# Patient Record
Sex: MALE | Race: WHITE
[De-identification: names, ages, dates, MRNs, and addresses within clinical notes are randomized per-mention and may not be internally consistent; named-entity substitution may affect disease eponyms.]

---

## 2021-11-03 ENCOUNTER — HOSPITAL ENCOUNTER (EMERGENCY)
Dept: HOSPITAL 35 - ER | Age: 61
Discharge: PSYCHIATRIC OR SUBSTANCE USE FACILITY | End: 2021-11-03
Payer: COMMERCIAL

## 2021-11-03 ENCOUNTER — HOSPITAL ENCOUNTER (INPATIENT)
Dept: HOSPITAL 35 - SBH | Age: 61
LOS: 15 days | Discharge: NURSING, REHAB OR LONG-TERM CARE | DRG: 885 | End: 2021-11-18
Attending: PSYCHIATRY & NEUROLOGY | Admitting: PSYCHIATRY & NEUROLOGY
Payer: COMMERCIAL

## 2021-11-03 VITALS — DIASTOLIC BLOOD PRESSURE: 73 MMHG | SYSTOLIC BLOOD PRESSURE: 153 MMHG

## 2021-11-03 VITALS — HEIGHT: 63 IN | WEIGHT: 239 LBS | BODY MASS INDEX: 42.35 KG/M2

## 2021-11-03 VITALS — HEIGHT: 63 IN | BODY MASS INDEX: 35.44 KG/M2 | WEIGHT: 200 LBS

## 2021-11-03 VITALS — SYSTOLIC BLOOD PRESSURE: 147 MMHG | DIASTOLIC BLOOD PRESSURE: 83 MMHG

## 2021-11-03 DIAGNOSIS — F03.91: ICD-10-CM

## 2021-11-03 DIAGNOSIS — T43.212A: ICD-10-CM

## 2021-11-03 DIAGNOSIS — Z88.2: ICD-10-CM
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
## 2021-11-03 DIAGNOSIS — F29: ICD-10-CM

## 2021-11-03 DIAGNOSIS — F32.3: Primary | ICD-10-CM

## 2021-11-03 DIAGNOSIS — I10: ICD-10-CM

## 2021-11-03 DIAGNOSIS — J45.909: ICD-10-CM

## 2021-11-03 DIAGNOSIS — R45.851: ICD-10-CM

## 2021-11-03 DIAGNOSIS — E78.5: ICD-10-CM

## 2021-11-03 DIAGNOSIS — Z20.822: ICD-10-CM

## 2021-11-03 DIAGNOSIS — Z79.899: ICD-10-CM

## 2021-11-03 DIAGNOSIS — F32.9: ICD-10-CM

## 2021-11-03 DIAGNOSIS — Z79.1: ICD-10-CM

## 2021-11-03 DIAGNOSIS — F91.9: Primary | ICD-10-CM

## 2021-11-03 DIAGNOSIS — F01.51: ICD-10-CM

## 2021-11-03 DIAGNOSIS — Z98.890: ICD-10-CM

## 2021-11-03 DIAGNOSIS — Z79.891: ICD-10-CM

## 2021-11-03 DIAGNOSIS — E03.9: ICD-10-CM

## 2021-11-03 PROCEDURE — 10880: CPT

## 2021-11-04 VITALS — DIASTOLIC BLOOD PRESSURE: 55 MMHG | SYSTOLIC BLOOD PRESSURE: 127 MMHG

## 2021-11-04 VITALS — DIASTOLIC BLOOD PRESSURE: 84 MMHG | SYSTOLIC BLOOD PRESSURE: 152 MMHG

## 2021-11-04 VITALS — DIASTOLIC BLOOD PRESSURE: 81 MMHG | SYSTOLIC BLOOD PRESSURE: 127 MMHG

## 2021-11-04 VITALS — SYSTOLIC BLOOD PRESSURE: 125 MMHG | DIASTOLIC BLOOD PRESSURE: 97 MMHG

## 2021-11-04 LAB
CHOLEST SERPL-MCNC: 176 MG/DL (ref ?–200)
FOLATE SERPL-MCNC: 17.8 NG/ML (ref 8.6–58.9)
HDLC SERPL-MCNC: 46 MG/DL (ref 40–?)
LDLC SERPL-MCNC: 102 MG/DL (ref ?–100)
TC:HDL: 3.8 RATIO
TRIGL SERPL-MCNC: 142 MG/DL (ref ?–150)
VIT B12 SERPL-MCNC: 519 PG/ML (ref 193–986)
VLDLC SERPL CALC-MCNC: 28 MG/DL (ref ?–40)

## 2021-11-04 NOTE — NUR
RESUMMED CARE FROM OVERNIGHT SHIFT THIS AM, PATIENT IN ROOM LYING QUIET.
PATIENT ALERT ORIENTED TIMES 4 PATIENT DENIES HI/AH/VH AT PRESENT DOES HAVE
PASSIVE SI. PATIENT STATES HE HAS ANXIETY THAT HE RATES AT A 8, DENIES
DEPRESSION. PATIENT ATE BREAKFAST TOOK MEDICATION WITHOUT INCIDENCE, PATIENTS
ABDOMEN SOFT BOWEL SOUNDS PRESENT. PATIENTS LUNGS CLEAR PATIENT WHEN ASKED TO
EAT IN HIS ROOM FOR MEALS; PATIENT STATED TO ME THAT HE IS SUICIDAL. I TOLD
THE PATIENT HE HAS TO STAY IN THE DAY ROOM; I SHUT HIS DOOR. STAFF GOES WITH
HIM TO HIS ROOM WHEN HE HAS TO USE THE BATHROOM. I TOLD DR WOODY ABOUT THE
INCIDENCE. WILL CONTINUE TO MONITOR PATIENT FOR SAFETY AND BEHAVIORS.

## 2021-11-04 NOTE — NUR
New admit to [facility]. Noted pt triggered high risk screen for 2-14 lb loss and
decreased appetite. Pt unavailable at time of visit, unable to determine
weight hx. Refused breakfast and lunch today, admitted late last noc. Will add
Ensure daily until intake pattern can be assessed. RD to follow up r/t weight
and intake hx. Low nutrition risk at this time.

## 2021-11-04 NOTE — NUR
Damir arrived to [facility] from the ER at 2019 via wheelchair and ED staff. He was
cooperative throughout the admission process. He presented with a disorganized
and tangential thought process as pt would ramble when talking and it was
difficult to follow along with what the pt was saying. He was alert and
oriented x4 and endorsed passive SI at this time. He denied a plan, although
was evasive when answering this question, but was able to contract for safety.
He denied HI/BAKER. Damir is being admitted due to an SI attempt where he
intentionally OD'd on #30 of trazodone 150 mg on 10/31. He stated that what
the "" told him upset him, and he stated "I'm not depressed but when
everything happens at once my anxiety gets out of control". Pt expressed that
he has a hx of bipolar "with anxiety and OCD" and experienced a panic attack
Sunday morning, prior to his suicide attempt. Pt expressed he immediately
called his brother because "I'm too chicken to die". He stated that his last
manic episode was around September/October and that he "charges things to his
account" to where he spends all of his money. He reported difficulty getting a
job as he "made a stupid mistake" and has a misdemeanor for theft and has had
difficulty getting a job since. He reports past suicide attempts, the last was
Feb of 2020 where he took more lithium than prescribed. He also reports he
lost his father in Feb of 2020 and was his primary caregiver leading up to his
death. He spoke about his brothers and neices, and appears to have good
support within his family. He stated he was taking trazodone at  as he has
"racing thoughts at night and can't sleep". Pt arrived to the unit with
clothing items that have been inventoried. Pt appeared dischelved and unkept
upon arrival. Pt was medication compliant this evening and has been withdrawn
and isolative to his room since his arrival. Pt stated he had a BM after his
arrival to the unit and did not endorse any physical complaints at this time.
Will continue to monitor.

## 2021-11-05 VITALS — DIASTOLIC BLOOD PRESSURE: 65 MMHG | SYSTOLIC BLOOD PRESSURE: 114 MMHG

## 2021-11-05 VITALS — SYSTOLIC BLOOD PRESSURE: 132 MMHG | DIASTOLIC BLOOD PRESSURE: 89 MMHG

## 2021-11-05 LAB
EST. AVERAGE GLUCOSE BLD GHB EST-MCNC: 126 MG/DL
GLYCOHEMOGLOBIN (HGB A1C): 6 % (ref 4.8–5.6)

## 2021-11-05 NOTE — NUR
At 0550 pt came out of his room expressing he felt sick to his stomach and did
not sleep at all. This RN went and talked to him in his room and offered pt
zofran which he declined as he stated it was anxiety related. Pt stated that
he continues to experience panic attacks and stated multiple times "it's not
getting better. I'm going home to the same thing" and appeared preoccupied
with what he has to deal with once he's home. Emotional support given and pt
encouraged to use deep breathing techniques but he responded "I can't". He
also stated "I can't cope". He expressed that he feels he is getting worse and
pt was educated that his body is adjusting to his medication changes and that
this is not abnormal. Encouragement given to work on coping skills while he is
here at the hospital and to talk to Dr. Fitzgerald regarding his concerns.

## 2021-11-05 NOTE — NUR
11---1230--Call receoived from patient's daughter re: paperwork being
faxed to [facility] where the patient came from.  I explained I have tried 3
different times with thrree numbers that were given to me by Andra and my fax
report says "no response".  I receoived another number [phone] and tried
to fax to this number and it came back no response again.  I gave daughter the
update I received this am in team meeting.  She states her mother called the
nurse this AM and has not received a call back and she asked me to have the RN
call her mother.  RN states she has the mother's number but she has been gone
for seven days and doesn't have any information about him.  I suggested she
have Portia DON call the wife and goive her information as she hs been here
every day.  Call tried again to Andra ([phone]) no answer; message
left to call me back.

## 2021-11-05 NOTE — NUR
DONI and Dr. Fitzgerald were able to talk to the Pt's brother/DPOA, Ted Ellsworth
over the phone, [phone].  Ted was able to give a brief history.  Pt
has had a lifetime of mental illness.  Pt has a dx of Bi-polar and OCD.  Pt
has been hospitalized several times over the last 15 years.  Pt was
hospitalized at [address], [facility], and [place].  Pt recieves
mental health services through [facility] which includes
medication mangment and case mangement.  The Pt's  is David Joy, [phone].  Pt's father was his caregiver until he passed several
years ago.  Since the Pt has lived on his own. Pt has a history of impulsive
spending.  Ted manages the Pt's money and pays his bills. Pt has no
children.
Ted is
intrested in a nursing home placement for the Pt.  Ted denied that the Pt
had any developmental disabilites.  There were no other questions or concerns
at this time.
 
A family meeting was set for 11/9/2021.  This will be by phone.
DONI will continue to follow.
 
DONI did call the Pt  and left a message for a call back as of this
not there has been no call back.

## 2021-11-05 NOTE — NUR
Damir was alert and oriented x4 this shift. He was in the dayroom at the
beginning of the shift watching TV until he went to bed. He voiced excessive
worries regarding needing to get his booster shot and regarding his car. When
asked if he was experiencing SI he denied and again mentioned his stressors
once returning home. Pt was medication compliant and shortly after went to
bed. Pt denied HI/HA. Pt voiced worries about not being able to sleep and was
encouraged to let this nurse know if he had difficulties. Pt at this time
(0224) has not been noted getting up. Will continue to monitor.

## 2021-11-05 NOTE — NUR
SPEECH MUMBLED AND DIFFICULT TO UNDERSTAND-CONVERSATION FRAGMENTED AND
DISORGANIZED,FREQUENTLY VOCALIZING NEGATIVE THOUGHTS "I'M NO GOOD" "I CAN'T
TAKE CARE OF MYSELF" "ITS NOT GOING TO GET ANY BETTER" REPORTS HAVING MOOD
SWINGS AND RACING THOUGHTS AS WELL AS HEARING A VOICE TELLING HIM THAT HE MAY
AS WELL "GIVE UP" BECAUSE "THINGS ARN'T GOING TO GET ANY BETTER" ASKED FOR
ASSIST WITH AM SHOWER STATING "I STINK" AND LATER STATES HE NEEDS SOME
UNDERWEAR BECAUSE "I HAD TO WASH MINE OUT BE HAND YESTERDAY" DID GO INTO
BATHROOM AND RUN WATER IN SHOWER BRIEFLY BUT REQUIRED STAFF TO WIPE FECES FROM
PERINEAL AREA AND PUT ON BRIEF AND ASSIST WITH CLOTHING CHANGE. GAIT IS STEADY
WITHOUT ASSISTIVE DEVICES-PACING IN HALLWAYS DURING FREE TIME

## 2021-11-06 VITALS — DIASTOLIC BLOOD PRESSURE: 80 MMHG | SYSTOLIC BLOOD PRESSURE: 144 MMHG

## 2021-11-06 NOTE — NUR
Assumed pt care at 0700. pt was alert and oriented x4. Assessments completed,
vss. Lungs clear, active bowel sound, Denies pain at this time. Cooperative.
Took meds whole, no dificulty noted. Ambulates with a steady gait. No sign of
acute distress noted upon assessments. Continent of bowel and bladder this
shift. Makes needs known to staff. 1310 pt stated to staff he wants to kill
himself. Writter assessed pt. Pt stated he wants to kill him self because he
has bed bugs in his house. Pt continued to say he did not want to go back to a
house filled with bed bugs. Pt denies having any plan at this time. Pt wander
the unit hallway. AT this time pt is in his room. Will continue to monitor.

## 2021-11-06 NOTE — NUR
11-5-21 CARE TRANSFERRED 1900 OBSERVED PT WALKING IN HALLWAY. LATER PT AAOX4,
VSS, RR EVEN AND NONLABORED ON RA. PT DENIES SI/HI/VAH AND PAIN. PT STATES
"THE MEDICATION IS WORKING, I AM NOT HEARING THAT LITTLE VOICE" PT PLEASANT,
CALM AND COOPERATIVE THROUGHT NURSING ASSESSMENT. DURING MEDICATION PT HAD NO
DIFFICULTIES.

## 2021-11-06 NOTE — H
[facility]
[address]
[place]   [address]                     HISTORY AND PHYSICAL          
_______________________________________________________________________________
 
Name:       VICKEY LOPEZ                Room #:         517-A       ADM IN  
M.R.#:      [id number]                       Account #:      [id number]  
Admission:  11/03/21    Attend Phys:    Benedicto Fitzgerald DO
Discharge:              Date of Birth:  [birth year]  
                                                          Report #: [id number]
                                                                    [id number] 
_______________________________________________________________________________
THIS REPORT FOR:  
 
cc:  Levon Bates MD, Steven A. MD Kerstein,Benedicto HECK DO                                        ~
 
DATE OF SERVICE: 11/04/2021
 
INPATIENT PSYCHIATRIC EVALUATION
 
ATTENDING PSYCHIATRIST:  Benedicto Fitzgerald DO
 
MEDICAL CONSULTANT:  VEDA Crowley and Lan Perales MD and his 
hospitalist team.
 
SOURCES OF INFORMATION:  Records from [facility], interview with the
patient, chart review.
 
CHIEF COMPLAINT:  "Medication is not working."
 
HISTORY OF PRESENT ILLNESS:  This is a 61-year-old obese, BMI 42.4,  
male, has a beard.  The patient was apparently brought on 10/31/2021 to [facility].  He
overdosed on trazodone, "I have told my brother, I have told my interest in 
pills.  The patient was lethargic, slurring words much of the time, states that 
he wants help. His wife called his brother.  His brother was apparently out of 
the town, in [place].  His brother is actually now driving back to [place]. I reached his brother, Ted on [phone].  The patient has had 
suicide attempts in the past.
 
PAST PSYCHIATRIC HOSPITALIZATIONS:  Include numerous psychiatric center 
admissions in 2010 and 2015.  The patient told he took 20-30 trazodone to kill 
himself.  The patient denies drug or alcohol use.  He reports being hospitalized
in 02/2021 for suicidal ideation.  The patient does not remember where.  It was 
30 tablets of 150 mg trazodone at 9:00 a.m.
 
REVIEW OF SYSTEMS:  From [facility]:
CONSTITUTIONAL:  Denies fever.
CARDIOVASCULAR:  Denies chest pain.
GASTROINTESTINAL:  Denies abdominal pain, nausea, vomiting.
NEUROLOGIC:  Denies headache.
PSYCHIATRIC:  SI,AH-command.
 
Interestingly, he denied auditory hallucinations at [facility].
He did tell me today that he heard the voice telling him to go ahead and take 
it.  It was hard to get a great history of hallucinations he is having, but from
his description, they have been presents in last several weeks.
 
 
 
[address]   [address]                     HISTORY AND PHYSICAL          
_______________________________________________________________________________
 
Name:       VICKEY LOPEZ                Room #:         517-A       [facility] [place]  
[facility]#:      [id number]                       Account #:      [id number]  
Admission:  11/03/21    Attend Phys:    Benedicto Fitzgerald DO
Discharge:              Date of Birth:  [birth year]  
                                                          Report #: [id number]
                                                                    [id number] 
_______________________________________________________________________________
 
 
MEDICATIONS:  Cholecalciferol; vitamin D3, 2000 international units p.o. daily. 
Lisinopril 10 mg oral daily, levothyroxine 75 mcg oral daily, alprazolam 0.5 mg 
p.o. 3 times a day, Seroquel 400 mg at bedtime, atorvastatin 5 mg p.o. at 
bedtime, trazodone 150 mg p.o. at bedtime, aspirin 81 mg oral daily.
 
MEDICAL HISTORY:  Hypertension, hypothyroidism, insomnia, obesity.
 
PSYCHIATRIC HISTORY:  Depression, suicidal ideation, bipolar disorder.
 
LABORATORY DATA:  EKG at [facility] on 11/02/2021 showed QTc interval of 455 
milliseconds,  milliseconds, MN interval 176 milliseconds, normal sinus 
rhythm.
 
Labs from 07/31/2021; sodium 140, potassium 4.6, chloride 103, bicarbonate 28, 
anion gap 14, BUN 13, creatinine 1.1, GFR 72, glucose 124, calcium 9.0, total 
bilirubin 0.4, AST 30, ALT 37, alkaline phosphatase 152, total protein 8.0, 
albumin 3.8.  White blood cell count 8.2, H and H 14.9 and 47.5, platelet count 
274. SARS-CoV-2 rapid was negative.  It looks like this was from 10/31.  UDS was
positive for tricyclics- likely due to
quetiapine.   UDS was negative.  The patient was
medically cleared for psychiatric admission.  
 
While this was achieved on the 1st
and he did not arrive till today- unsure of why the delay. 
[facility] was faxed information,
request. 
 
 Labs here at [place]; A1c is pending, triglycerides 142, 
cholesterol 176, HDL 46, B12 of 519, folate 17.8.  TSH 2.003.  COVID-19 serology
was not detected.
 
PHYSICAL EXAMINATION:
VITAL SIGNS:  Temperature 36.4, pulse 83, respirations 19, /81, and O2 
sat 91% on room air.
GENERAL:  Well-developed, disheveled, obese  male, apparently stated 
age.  Slow gait, normal station.
 
MENTAL STATUS EXAMINATION:  Well-developed, somewhat ill-appearing  
male, apparently looks stated age.  Attention fair.  Concentration limited.  
Speech soft, difficult to understand. Unclear whether this was just psychomotor 
retardation or some form of dysarthria.  Thought process linear and goal 
directed.  Thought content focused on medications, not working.  He did admit to
staff having SI earlier in the day, denies currently. Some helplessness, 
hopelessness.  Mood and affect depressed, congruent and restricted.  Memory not 
 
 
 
[facility]
[address] [place]   [address]                     HISTORY AND PHYSICAL          
_______________________________________________________________________________
 
Name:       VICKEY LOPEZ                Room #:         517-A       ADM IN  
[facility]#:      [id number]                       Account #:      [id number]  
Admission:  11/03/21    Attend Phys:    Benedicto Fitzgerald, DO
Discharge:              Date of Birth:  [birth year]  
                                                          Report #: [id number]
                                                                    [id number] 
_______________________________________________________________________________
 
formally tested.  I have deferred to Dr. Perales due to his degree of perceived 
depression, psychomotor retardation.  Insight and judgment limited.  Fund of 
knowledge, no greater than average.
 
EDUCATIONAL HISTORY:  Reports high school.  Reports he has been on disability. 
It is unclear if he has ever been  or had kids.
 
CRIMINAL JUSTICE HISTORY: Reports he has a misdemeanor burglary charge for 
stealing cigarettes.
 
FORMULATION:  A 61-year-old  male, transferred from [facility] after 
intentional suicide attempt with trazodone.
 
DIAGNOSES:  At this time, major depressive disorder, single episode, severe 
degree; to rule out bipolar disorder by history; multiple morbidities including 
obesity, hypertension, hyperlipidemia, hypothyroidism.  Also, in the hospice 
today, he denied fever, chills, nausea, vomiting, diarrhea, chest pain, 
dizziness, blurred vision, or headache.  Ten-point review of systems done by the
hospitalist today was negative.  Physical exam was essentially normal.  The 
patient is a full code.
 
ALLERGIES:  SULFONAMIDE DRUGS.
 
STRENGTHS:  He is insured, has his brother, sounds like he lives in low income 
housing.
 
LIMITATIONS: Poor coping skills.  He did say his psychiatric provider at [facility] but did not know the name.
 
PLAN: Voluntary admit to [facility]
Evaluate and Stabilize
Start Risperdal1 mg bid
D/C seroquel due to sedation, ? efficacey in this case
Call the brother Ted for telephoe conference [phone].
 
CURRENT MEDICATIONS:  In the hospital, atorvastatin 40 mg daily, Celexa 20 
mg oral daily, famotidine 20 mg oral daily, levothyroxine 112 mcg oral daily, 
Cozaar 100 mg p.o. daily.  I discontinued the Seroquel.  He was on a 250 mg 
total daily dose and was started on Risperdal 1 mg p.o. b.i.d. for the auditory 
hallucinations as well as augmentation of the Celexa.  I want to wait to get a 
better psychiatric treatment history tomorrow from the brother.  I have asked 
 to attempt to get his recent treatment records from [facility].
 
 
 
 
[place] [address]                     HISTORY AND PHYSICAL          
_______________________________________________________________________________
 
Name:       JOHNVICKEY J                Room #:         517-A       ADM IN  
[facility].#:      [id number]                       Account #:      [id number]  
Admission:  11/03/21    Attend Phys:    Benedicto Fitzgerald DO
Discharge:              Date of Birth:  [birth year]  
                                                          Report #: [id number]
                                                                    [id number] 
_______________________________________________________________________________
 
Time spent on this case was in excess of 60 minutes, greater than 50% of the 
time spent reviewing records and coordination of care.
 
 
 
 
 
 
 
 
 
 
 
 
 
 
 
 
 
 
 
 
 
 
 
 
 
 
 
 
 
 
 
 
 
 
 
 
 
 
 
 
 
  <ELECTRONICALLY SIGNED>
   By: Benedicto Fitzgerald DO        
  11/06/21 2017
D: 11/04/21 1435                           _____________________________________
T: 11/04/21 1657                           Benedicto Fitzgerald,           /nt

## 2021-11-07 VITALS — DIASTOLIC BLOOD PRESSURE: 66 MMHG | SYSTOLIC BLOOD PRESSURE: 127 MMHG

## 2021-11-07 VITALS — SYSTOLIC BLOOD PRESSURE: 149 MMHG | DIASTOLIC BLOOD PRESSURE: 97 MMHG

## 2021-11-07 VITALS — SYSTOLIC BLOOD PRESSURE: 154 MMHG | DIASTOLIC BLOOD PRESSURE: 87 MMHG

## 2021-11-07 NOTE — NUR
11/06/21; Pt is alert/oriented, very anxious and not able to redirect. Pt is
very fixated on losing his house, not being able to live on his own, not being
able to pay his bills. Initially wanted something for sleep but then states'
It aint gone help" 1;1 comfort attempt with minimum sucess pt just repeats
over and over "it aint gone help" Pt took medications whole without
difficulty. Throughout shift remains fixated on his situation. Denies
SI/HI/AH/VH.

## 2021-11-07 NOTE — NUR
Pt noted by a nurse to be trying to choke himself, Telephone call to NP Sid,
order for 1:1, discussed that the plan is to bring him out in the dayroom, Pt
has hydroxine ordered gave him PRN dose. Will put the order in for 1:1 while
in room but to have him in day room for closer monitoring until able to
provide 1:1. Will also make sure oncoming shift is aware and that pt receives
further work up. Pt is not able to be redirected and continues to just say his
situation is not going to change.

## 2021-11-07 NOTE — NUR
11---0615--Patient seen at his request. He states he is worried about
the bill he is going to get from here. He states he only has Medicare and
thinks he makes too much for [employer]. I explained that we could [facility] so they can help him aply for this.  He was agreeable to this.

## 2021-11-07 NOTE — NUR
Resummed care from overnight shift this am. Client was in activity area
resting as client was on room lock out due to previously attempting to choke
himself in his room. Client currently remains on room lockout at the time of
this note, and is to remain on room lock out or one to one if in room per VEDA Lau.
 
Client presented calm and appropriate. Client was oriented 4x and presented
with pleasant mood. Client voiced that he was still having depression and
anxiety, and rated depression and anxiety 8/10 for both, stating that he was
struggling with being in the facility, and adjusting. Client was encouraged to
use coping skills at this time, and encouraged to talk to staff. Client stated
that he was tired, but that he was not suicidal or homicidal at this moment
when quesitons where asked. Client denied audio or visual hallucinations.
Client voiced no pain during this time, and stated that he had a bowel
movement this morning. Vitals were rechecked to ensure health, and were found
to be 149/97 bp, 98 O2.
 
During assessment, client was fixated on how he was going to pay his bills,
stating that he needed to contact the IRS, and stated that "the social workers
are stealing my wallet" "I need to call about my bills." Client was assured
that someone would speak to him from social work, but that no one was stealing
his money. Client was also assured that he would be speaking with care team
soon. Client agreed, and was calmer after this. At time of this note, client
is still on room lock out. No behaviors or concerns at this time.

## 2021-11-08 VITALS — DIASTOLIC BLOOD PRESSURE: 83 MMHG | SYSTOLIC BLOOD PRESSURE: 148 MMHG

## 2021-11-08 VITALS — SYSTOLIC BLOOD PRESSURE: 108 MMHG | DIASTOLIC BLOOD PRESSURE: 66 MMHG

## 2021-11-08 VITALS — DIASTOLIC BLOOD PRESSURE: 66 MMHG | SYSTOLIC BLOOD PRESSURE: 108 MMHG

## 2021-11-08 NOTE — NUR
Patient sitting in recliner chair in dining room this evening upon assumption
of care.  Patient is A/O x4 and able to verbalize needs.  Speech is low in
volume and patient has a flat affect.  Denies pain, SI/HI/AVH at present time.
 Ambulated independently to patient room at  with slow and steady gait.
Resting in bed with eyes closed.  Bed is in low position, bed alarm on, with
patient items within reach.  Takes meds whole with sips of water.  Cooperative
with plan of care.  Continue to monitor.

## 2021-11-08 NOTE — NUR
PATIENT WAS SITTING UP IN DINING ROOM THIS EVENING. HE IS A/0X4. HE IS SPEAKS
LOW AT TIMES AND ALMOST AS IF HE MUMBLES. PATIENT DENIES PAIN, SI/HI/AVH.
PATIENT IS ON ROOM LOCK OUT AND IS ON 1:1 WHEN HE IS IN HIS ROOM. PATIENT HAD
TO START OUT SLEEPING IN DINING ROOM THIS EVENING D/T UNABLE TO BE 1:1 WITH
PATIENT. HE WAS FRUSTRATED WITH THIS. DID MOVE PATIENT BACK TO HIS ROOM ONCE
ABLE TO BE 1:1 WITH PATIENT. PATIENT HAS BEEN RESTING WITH EYES CLOSED SINCE
IN HIS ROOM. HE IS INDEPENDENT WITH CARES. HE HAS BEEN CALM AND COOPERATIVE
TONIGHT. BED IN LOW POSITION AND BED ALARM ON. ROUTINE ROUNDS TO ASSESS SAFETY
AND STATUS OF PATIENT. PATIENT TOOK HIS MEDS WHOLE WITH WATER. CONTINUING TO
MONITOR.

## 2021-11-08 NOTE — NUR
DENIES SI THIS AM DURING AM ASSESSMENT-CONTINUES TO MUMBLE/TALK VERY SOFTLY
WITH CHIN IN CHEST BUT FREQUENCY SIGNIFICANTLY LESS. ASKS MULTIPLE TIMES TO GO
BACK TO ROOM AND APPEARS TO HAVE NO INSIGHT INTO CORRELATION OF HIS SUICIDAL
COMMENTS AND GESTURES AND CURRENT ROOM LOCKOUT/RESTRICTIONS. WILL
OCCASSIONALLY GET UP AND PACE IN HALLWAY. COMPLIENT WITH MEDS-IS PRESENT IN
GROUP BUT LITTLE ACTIVE PARTICIPATION OBSERVED.GAIT STEADY WITHOUT ASSISTIVE
DEVICES.

## 2021-11-09 VITALS — SYSTOLIC BLOOD PRESSURE: 142 MMHG | DIASTOLIC BLOOD PRESSURE: 84 MMHG

## 2021-11-09 VITALS — DIASTOLIC BLOOD PRESSURE: 55 MMHG | SYSTOLIC BLOOD PRESSURE: 108 MMHG

## 2021-11-09 VITALS — SYSTOLIC BLOOD PRESSURE: 108 MMHG | DIASTOLIC BLOOD PRESSURE: 55 MMHG

## 2021-11-09 NOTE — NUR
Damir was alert and oriented x4 this shift. He presented as calm, cooperative,
and pleasant. He was noted frequently smiling this shift and told this RN
"I'm not wanting to kill myself today, I finally got some sleep last night".
Pt denied SI/HI/HA and contracted for safety stating he would seek out staff
if he started to experience suicidal thoughts. He expressed he feels things
have improved since admission. He was medication and meal compliant and asked
for "anxiety medication" this morning, given at 0948 for an anxiety level of
7/10; 10 being the worst. Pt stated medication was effective in lowering his
anxiety. Pt was social with peers this shift but required
redirection/education when pt was noted giving a female pt a back massage. Pt
responded appopriately to redirection and attended groups this shift. Will
continue to monitor.

## 2021-11-10 VITALS — DIASTOLIC BLOOD PRESSURE: 84 MMHG | SYSTOLIC BLOOD PRESSURE: 142 MMHG

## 2021-11-10 VITALS — SYSTOLIC BLOOD PRESSURE: 113 MMHG | DIASTOLIC BLOOD PRESSURE: 54 MMHG

## 2021-11-10 NOTE — NUR
PATIENT CARE WAS RESUMED AT 1900. HE IS ALERT AND ORIENTED . HE AMBULATE AND
DENIES PAINS. LUNGS ARE CLEAR BS ACTIVE X4 QUAD. ABLE TO VERABLIZE SOME NEEDS.
HE IS CONTINET OF BOWEL AND BLADDER. DENIES PAINS/SI /AVH/HI. BED IS LOW,
LOCKED.HE TOOK HIS MEDS WHOLE,  CONTINUE CARE AND MONITOR

## 2021-11-10 NOTE — NUR
Pt was alert and oriented x4 this shift. He presents with a flat affect and
appears more depressed and withdrawn than the previous day. He reports that he
did not sleep well last night, "only 2 hours", and repeatedly made statements
of, "I'm going back to the same thing" and appeared to be very preoccupied and
focused on this. This RN reminded pt that he had a meeting yesterday and SW
and staff were working on finding pt an assisted living where he can receive
more help but pt did not seem to process this information. He endorsed passive
SI but was hesitant to express this information to this RN because "I don't
want to be locked out of my room". Pt denied a plan/intent and was able to
contract for safety. Pt was encouraged to be honest with staff and that if he
can seek out staff when his SI thoughts are worsening, his room would not need
to be locked; pt agreed. Pt took periodic naps throughout the day but was
otherwise in the dayroom and participated in groups. He voiced wanting to
continue group therapy after discharge. He rated his depression 10/10 and
anxiety 10/10 this shift; 10 being the worst. He denied HI/HA. Pt made his
brother, Ted, his DPOA this shift and Ted came to  pt's keys to
his apartment to help pt with his belongings. Pt was given mag hydroxide for
constipation as pt stated his last BM was about 3 days ago, this was
communicated to Dr. Jacobson as well. Pt was medication and meal compliant
this shift, without difficulty. He has not voiced any physical complaints this
shift, will continue to monitor.

## 2021-11-10 NOTE — NUR
RT Progress Note- Damir has been present in the milieu as he was previously
placed on room lockout d/t suicidal statments and ideation. He has also been
present in groups though participation is very variable based on his mood.
Damir often perseverates on things like his car repair, his brother, or the
lawn that needs to be mowed at home. He is very verbal on updating his SI
status to the group, which varies from him being suicidal to no longer
suicidal and slightly hyper active. CTRS and RT team will continue to
encourage his participation and progress.

## 2021-11-11 VITALS — SYSTOLIC BLOOD PRESSURE: 162 MMHG | DIASTOLIC BLOOD PRESSURE: 84 MMHG

## 2021-11-11 VITALS — SYSTOLIC BLOOD PRESSURE: 166 MMHG | DIASTOLIC BLOOD PRESSURE: 94 MMHG

## 2021-11-11 NOTE — NUR
Pt has been calm and co-operative this shift. He came out to the dayroom for
meals and group. Upon assessment this morning, he denied having thoughts of
SI/HI. Appears to be in better spirits today. He has been smiling at times.
This morning, pt also reported he did not sleep well because of his
roommate keeping him up throughout the night. Pt complained of having some
pain in both of his knees, PRN Tylenol administered and was effective. Pt has
not had any other complaints since. Pt had a bowel movement this shift. He has
had a good appetite and fluid intake.

## 2021-11-11 NOTE — NUR
At onset of night shift pt was resting in bed awake. This shift pt was alert
and oriented x4. Pt was not very talkative with writer and did not make eye
contact. Pt did deny SI, HI and AVH. Pt was compliant with vital signs and
medications. Pt requested a sleep PRN and requested hydroxyzine; recieved both
with HS medications. Pt appeared calm and pleasant. Pt is a low fall risk and
independent. Will continue to monitor.

## 2021-11-11 NOTE — NUR
DONI and Dr. Bowie spoke with Ted concerning discharge.  Ted is in
agreement with Pt being placed.  Ted stated that he would like the Pt to be
placed.  Teds suggested a SNF stay.  It was explained that Pt would need to
meet critieria for a SNF.  PT and ST was ordered on the pt.  Ted is in
agreement with a respite stay for the Pt also.  A meeting was set for
11/15/2021 @ 11am for and update.
 
DONI will send out referrals

## 2021-11-11 NOTE — NUR
11---Call made with MD to RICHARD to advise him that [employer] would only pay
through the 12th.  Won't pay for weekend.
Need to place soon.  Ted RAMOS lifes in [facility].  Three options
given to RICHARD by Doctor
1) return to his apartment with case management from [facility]
2)friend or family mmber he can stay with
Respite [facility] stay to buy some time for placement and long term solution
RICHARD Jean will call brother, David who is also a DPOA.

## 2021-11-12 VITALS — DIASTOLIC BLOOD PRESSURE: 85 MMHG | SYSTOLIC BLOOD PRESSURE: 147 MMHG

## 2021-11-12 VITALS — SYSTOLIC BLOOD PRESSURE: 109 MMHG | DIASTOLIC BLOOD PRESSURE: 69 MMHG

## 2021-11-12 VITALS — SYSTOLIC BLOOD PRESSURE: 147 MMHG | DIASTOLIC BLOOD PRESSURE: 85 MMHG

## 2021-11-12 NOTE — NUR
Nutrition followup: pt eating 100% of most meals on 2 gm Na diet. No weight
since 11/3. Continue to request updated weight. Noted plan is for placement at
discharge. Low nutrition risk.

## 2021-11-12 NOTE — NUR
Assumed care of patient at 1900. Patient AOX3 this shift. Resting in bed with
eyes closed at beginning of shift et isolated in room most of the shift.
Ambulates room ad david with steady gait and no balance issues noted.
VSWNL. Health assessment with no abnormalities noted at
present time. Patient denies SI/HI/AVH at present time. Denies any pain or
discomfort at present time. Compliant with medication regimen. Answers
questions appropriately when asked and verbalizes thought process without
difficulty. Currently resting in bed with eyes closed. Will continue to
monitor per unit protocol. PRN Hydroxyzine and Trazodone given with HS meds
per patient request for anxiety and insomnia. Both medications appear to have
been successful in relieving symptoms.

## 2021-11-12 NOTE — NUR
DONI faxed referrals to the following
 
[facility]

## 2021-11-12 NOTE — NUR
Resummed care from overnight shift this am. Client was in room resting on
bed. Client presented oriented to self, situation, and year, though was not
oriented to date. Client denied any depression when asked, though did voice
anxiety. PRN hydroxizine was given to help with this. Client denied any
suicidal or homicidal intention at this time, stating that he felt better than
he did when he first arrived. Client denied any visual or audio hallucinations
at this time. Lung sounds were clear; bowel sounds present. Per client report,
last BM was this morning.
 
Client was given pt education this morning in regard to medications, but
declined to verbalize back what he was taking. Client was also encouraged to
go to group during this time, but client stated that he did not want to go to
group, and declined to participate.
 
During 10am, OT came for client session. Client was encouraged to participate
in OT, but stated that he could not at this point. When asked if he wanted a
shower, client stated that he had already taken one and didn't want one to
staff and OT. Client has been isolating in his room during this shift as he
has been doing for several days. 12 minute checks done to ensure safety. No
current concerns at this time. Will continue to encouraged client to attend
groups and sessions.

## 2021-11-13 VITALS — SYSTOLIC BLOOD PRESSURE: 112 MMHG | DIASTOLIC BLOOD PRESSURE: 77 MMHG

## 2021-11-13 VITALS — DIASTOLIC BLOOD PRESSURE: 78 MMHG | SYSTOLIC BLOOD PRESSURE: 144 MMHG

## 2021-11-13 NOTE — NUR
At onset of shift pt was sitting in the day room. Pt was locked out of his
room by day shift. Pt was irritated that he was locked out of his room. Pt was
observed talking to himself quietly. Pt denied SI and HI. Pt told RN that he
was not having thoughts of hurting himself and pt agreed that he would come to
staff if he started having thoughts of SI. RN allowed pt back in his room. Pt
was compliant with vital signs and medications; recieved PRN trazodone. Pt was
minimally talkative with RN. Speech is quiet, pt made poor eye contact. Pt
does smile when answering questions. Will continue to monitor.

## 2021-11-13 NOTE — NUR
Resummed care from overnight shift this am. Client was in room resting during
this time. Client was approached, and therapeutic communication was used to
ask client orientation questions as client has recently been isolating in his
room. Client presented oriented to self and place, but stated that he could
not note year and date during assessment. Client voiced that he was having
anxiety and depression, and rated it 7/10 at this time. Client was educated
about his increased dose of risperidone during this time, as this medication
can help with these symptoms. Client needed reinforcement on education, and
staff reexplained medication. Client stated that he was not feeling suicidal
or homicidal at this time. Client also stated that he did not have any
auditory or visual hallucinations. Client stated that he did not have pain.
Last BM per client was 11/12. Bowel sounds present; lungs clear.
 
Client was encouraged to go to morning movement group, but declined when staff
prompted.
 
Around lunch, client voiced that he had anxiety still. Client was encouraged
to go to spirital group during this time, especially as he declined prior
group. Client stated that he did not want to and that he didn't think it would
help with anxiety. Staff talked to client and reinforced coping skills at this
time to help with anxiety.
 
Client is currently in room resting after declining to eat dinner in activity
area. Dr. Hollins was notified of client's isolation and behaviors, and a room
lock out was requested to help client acclimate to environment and
participate in his treatment. No further concerns.

## 2021-11-14 VITALS — SYSTOLIC BLOOD PRESSURE: 157 MMHG | DIASTOLIC BLOOD PRESSURE: 86 MMHG

## 2021-11-14 VITALS — SYSTOLIC BLOOD PRESSURE: 146 MMHG | DIASTOLIC BLOOD PRESSURE: 97 MMHG

## 2021-11-14 LAB
ALBUMIN SERPL-MCNC: 3 G/DL (ref 3.4–5)
ALT SERPL-CCNC: 69 U/L (ref 16–63)
ANION GAP SERPL CALC-SCNC: 7 MMOL/L (ref 7–16)
AST SERPL-CCNC: 29 U/L (ref 15–37)
BILIRUB SERPL-MCNC: 0.5 MG/DL (ref 0.2–1)
BUN SERPL-MCNC: 19 MG/DL (ref 7–18)
CALCIUM SERPL-MCNC: 9 MG/DL (ref 8.5–10.1)
CHLORIDE SERPL-SCNC: 102 MMOL/L (ref 98–107)
CO2 SERPL-SCNC: 26 MMOL/L (ref 21–32)
CREAT SERPL-MCNC: 1.1 MG/DL (ref 0.7–1.3)
ERYTHROCYTE [DISTWIDTH] IN BLOOD BY AUTOMATED COUNT: 13.4 % (ref 10.5–14.5)
GLUCOSE SERPL-MCNC: 105 MG/DL (ref 74–106)
HCT VFR BLD CALC: 41.1 % (ref 42–52)
HGB BLD-MCNC: 13.6 GM/DL (ref 14–18)
MCH RBC QN AUTO: 29.3 PG (ref 26–34)
MCHC RBC AUTO-ENTMCNC: 33.1 G/DL (ref 28–37)
MCV RBC: 88.6 FL (ref 80–100)
PLATELET # BLD: 252 THOU/UL (ref 150–400)
POTASSIUM SERPL-SCNC: 3.8 MMOL/L (ref 3.5–5.1)
PROT SERPL-MCNC: 6.7 G/DL (ref 6.4–8.2)
RBC # BLD AUTO: 4.64 MIL/UL (ref 4.5–6)
SODIUM SERPL-SCNC: 135 MMOL/L (ref 136–145)
WBC # BLD AUTO: 7.4 THOU/UL (ref 4–11)

## 2021-11-14 NOTE — NUR
PATIENT CARE ASSUMED AT 0700, PATIENT STILL IN BED SLEEPING, GOT UP FOR
BREAKFAST, ALERT AND OREINTED X3, TOOK MEDICATION WHOLE, BREATH SOUND CLEAR,
ACTIVE BOWEL SOFT WITH SOFT AND ROUNDED AMBDOMEN, B/P /97, REG HR, SKIN
INTACT WITH NO EDEMA NOTED, PATIENT STAYS MOSTLY IN THE ROOM TODAY BUT CALM
AND COOPERATIVE WITH CALM, NO BEHAVIOR CHANGE FOR NOW, PATIENT AMBULATE ON A
STAEDY GAIT, TOILET HIMSELF INDEPENDENTLY, DENIES SI/HI. WILL CONTINUE TO
MONITOR PATIENT FOR SAFETY AND BEHAVIOR

## 2021-11-14 NOTE — NUR
Assumed care on 11/14/21 @ 1900, in his room lying in bed, eyes open, A&Ox4,
reported general pain of 6/10 cooperated with assessment and compliant with
medication administration. HRRR, Lung sounds CTA bilat, somewhat diminished
bases, ABD N x 4Q. Laid back down in bed, and seems to be sleeping with eyes
closed and respirations even and unlabored at this writing. Will continue to
monitor for safety and comfort as per unit protocol.

## 2021-11-14 NOTE — NUR
PATIENT AOX3 MAKES NEEDS KNOWN.PATIENT HAD A FLAT AFFECT, POOR EYE CONTACT,
FAIR GROOMING AND HYGIENE. PATIENT ENCOURAGED FLUIDS. PATIENT AMBULATES IN THE
ROOM AND HALLWAY WITH STEADY GAITS. PATIENT IN BED ASLEEP AT THIS TIME
BREATHING REGULAR AND UNLABOURED.

## 2021-11-15 VITALS — DIASTOLIC BLOOD PRESSURE: 93 MMHG | SYSTOLIC BLOOD PRESSURE: 146 MMHG

## 2021-11-15 VITALS — SYSTOLIC BLOOD PRESSURE: 146 MMHG | DIASTOLIC BLOOD PRESSURE: 93 MMHG

## 2021-11-15 VITALS — SYSTOLIC BLOOD PRESSURE: 135 MMHG | DIASTOLIC BLOOD PRESSURE: 71 MMHG

## 2021-11-15 NOTE — NUR
Damir was alert and oriented x4 this shift but appears to have poor insight.
He appears anxious, withdrawn, and worried this shift. He appears very
preoccupied with things he has to take care of such as "I have to get my mail
and my covid shot, and I have to check on my tags too". Pt was given emotional
support but appears resistant to education and encouragement as he is overly
worried. He denied SI/HI/HA but was later noted stating "I'm going to commit
suicide" which was stated under his breath. At that time pt was told to sit in
line of staff of site, which pt was compliant with. He was then told that the
plan is for him to either D/C to a nursing facility on Thursday or to his
brother, Ted's home. Pt appeared a little relieved at this and education
given, pt denied SI and appeared more calm at this time. Pt showered and is
currently resting in his room. Pt was medication and meal compliant this
shift, without difficulty. Will continue to monitor. Pt able to contract for
safety at this time.

## 2021-11-15 NOTE — NUR
DONI and Dr. Doran had a phone meeting with the Pt's family and .
The family is still intrested in placement.  BRIAN Hernandez, informed [facility] offers respite and he would inquire if there were any
opening in the facility.  DONI informed that the Pt is now [place] medicaid pending.
DONI explained the difficulty of placing the Pt due to Pt's SI and attempt.
Although the Pt has denied SI since 11/10/2021, this would still be a barrier
to a placement.  David made a suggestion of the Pt moving back into the
family home  with a plan for Pt's brothers and David to check on the Pt
daily.  DONI and Dr. Fitzgerald insisted this be the absolute last go to if
placement in an LTC and Respite does not work out.  There were no other
questions or concerns at this time.  DONI will continue to follow

## 2021-11-15 NOTE — NUR
SW sent referrals to the following
 
[facility]

## 2021-11-16 VITALS — SYSTOLIC BLOOD PRESSURE: 103 MMHG | DIASTOLIC BLOOD PRESSURE: 52 MMHG

## 2021-11-16 VITALS — DIASTOLIC BLOOD PRESSURE: 73 MMHG | SYSTOLIC BLOOD PRESSURE: 132 MMHG

## 2021-11-16 VITALS — SYSTOLIC BLOOD PRESSURE: 132 MMHG | DIASTOLIC BLOOD PRESSURE: 73 MMHG

## 2021-11-16 NOTE — NUR
Pt was alert and oriented x4 throughout the day but appeared to have poor
insight and judgement. He was ruminating on his situation and expressed having
"racing thoughts". He appeared withdrawn, depressed, and anxious this morning
and endorsed SI. He denied a plan/intent but did state he felt like acting on
these thoughts, therefore pt was locked out of his room so he could be in line
of sight of staff. Pt was given emotional support and reassurance but pt
appeared too preoccupied to retain the reassurance and education provided.
This was communicated to Dr. Fitzgerald and order for hydroxyzine 25 mg q6 hours
PRN was received and given at 0951 (pt stated hydroxyzine was effective in
past when given this admission). After administration pt continued to pace the
halls and voice "racing thoughts". This afternoon pt started to voice somatic
complaints that he was feeling sick and dizzy, and this RN believed pt was
working himself up to feel sick as there was no acute change otherwise. Dr. Fitzgerald was updated and order received for a once time dose of ativan 1.5mg
PO. Shortly after pt was noted smiling during group. After group and the
continuation of the evening pt has been smiling and more talkative with staff.
He told this RN a recipe he likes and called ativan "the magic pill" stating
he is "happy" and the "racing thoughts are gone". He currently denies SI and
is able to contract for safety. He was medication and meal compliant
throughout the day and voiced he had a BM this morning. Will continue to
monitor.

## 2021-11-16 NOTE — NUR
Assumed pt's care beginning of this pm shift. Pt alert and oriented x4. Pt was
in his bed at time of assessment. Denies si/hi. Did verbalize having
depression. Pt was calm and cooperative with care. No agitation or aggression
noted. Took meds per emar. Denied pain this shift. Able to ambulate to the
bathroom ad david. Pt continues to sleep in his room at this time. Nursing to
continue to monitor.

## 2021-11-17 VITALS — SYSTOLIC BLOOD PRESSURE: 155 MMHG | DIASTOLIC BLOOD PRESSURE: 88 MMHG

## 2021-11-17 VITALS — DIASTOLIC BLOOD PRESSURE: 75 MMHG | SYSTOLIC BLOOD PRESSURE: 120 MMHG

## 2021-11-17 NOTE — NUR
DONI spoke with the DON at [facility] in [place].  They are able to
accept the Pt.  They want to bring the Pt in under skilled rehab.  DONI informed
this Pt most likely would not qualify for this service as he has not skilled
needs at this time.  DONI did inform that the Pt's brother has offered to pay
for respite for a month until his medicaid is approved.  IDRIS stated she would
call DONI back Thursday concerning the matter.

## 2021-11-17 NOTE — NUR
Damir has been alert and oriented x4 this shift. He presented very anxious
this morning and expressed he was having "racing thoughts". PRN hydroxyzine
was given without effectiveness. He endorsed active SI but denied a plan but
did feel like acting on these thoughts. As soon as he woke up this morning he
stated "I have to get my booster shot". He was restless and came to the nurses
station frequently throughout the morning. He was encouraged to try to change
his negative thoughts into more positive ones such as instead of thinking
negatively about going to his brothers thinking how he has a safe place to go
to and a family that supports him. He was resistant to this and expressed he
was "hopeless". Pt then stated "I'm going to kill myself unless I get that
pill" referring to ativan. During treatment team they discussed this and pt
was ordered ativan PO 0.5mg twice a day. After receiving his first dose this
RN asked pt about an hour later how he was doing, and he voiced some decrease
in his anxiety, denied SI "since taking the pill", but continue to appear
depressed with a flat affect and was more withdrawn. He has since continued to
deny SI but has remained in line of sight of staff throughout the day. He was
medication and meal compliant this shift, without difficulty. He had a BM this
shift and his only complaint was bilateral knee pain, tylenol given per MAR
with effectiveness. Will continue to monitor.

## 2021-11-17 NOTE — NUR
Pt woke up, was asking for his wife, "I want to see her". Starting to get
agitated, holding the door like he was going to close it and not allowing
anyone in. Pt followed CNA to dayroom. Pt currently sitting in the dayroom,
calm.

## 2021-11-17 NOTE — NUR
DONI recieved a VM from BRIAN Hernandez, concerning repite for the Pt.  David
informed he has been able to secure respite with [facility] provider.  David stated
he would call DONI Thursday morning concerning the issue.

## 2021-11-17 NOTE — NUR
Pt was in the dayroom at time of assessment. Alert and oriented x4. Was calm
and cooperative with care. Pt denies racing thoughts, SI. Pt acknowledged some
anxiety and depression. Voiced it is better now compared to earlier in the
day. Pt took meds without issues. Currently in his room sleeping. Nursing to
continue to monitor.

## 2021-11-17 NOTE — NUR
Assumed pt's care this HS shift. Pt alert and oriented. Calm and had a happy
affect on assessment, sitting in the day.  Pt did verbalize having anxiety and
depression but voiced it was decreased compared to daytime. Pt also denied
SI/intent. Pt voiced he felt so much better after taking ativan. Pt voiced
that it turned off the racing thought, that it "flipped the switch". Pt
remained pleasant and cooperative. Voiced he may be able to go live with his
brother. Pt took meds without issues. Remains compliant with care. Currently
in his room, sleeping. Nursing to continue to monitor.

## 2021-11-17 NOTE — NUR
RT Progress Note- Damir has been present in all groups throughout this review
period, though he continues to isolate to his room during down time. His mood
level shifts very easily. He continues to endorse depression and on few
occasions has expressed suicidal ideation which has been communicated to the
team. RT team will encourage continued participation and progress.

## 2021-11-18 VITALS — DIASTOLIC BLOOD PRESSURE: 64 MMHG | SYSTOLIC BLOOD PRESSURE: 142 MMHG

## 2021-11-18 NOTE — NUR
Assumed pt care at 0700. Pt was alert and oriented x4. Assessments completed,
vss. Lungs clear, active bowel sound. NO sign of acute distress noted upon
Assessment. Took meds whole, no difficulty noted. Denies si/hi, Denies Pain.
Ambulates with a steady gait. Calm and cooperative with care. Continent of
bowel and bladder. Makes needs known to staff. Independent with toileting. Pt
was D/C at 1305 via w/c. Pt was d/c with his belongings, D/C packet, d/c
summary. Report was called at 1221 to Quin at [facility].

## 2021-11-21 NOTE — D
[facility]
Keri Santiago
[place]   [address]                     DISCHARGE SUMMARY             
_______________________________________________________________________________
 
Name:       VICKEY LOPEZ                Room #:         520B-B      DIS IN  
M.R.#:      [id number]                       Account #:      [id number]  
Admission:  11/03/21    Attend Phys:    Benedicto Fitzgerald DO
Discharge:  11/18/21    Date of Birth:  [birth year]  
                                                          Report #: [id number]
                                                                    [id number] 
_______________________________________________________________________________
THIS REPORT FOR:  
 
cc:  Levon Bates MD, Steven A. MD Kerstein,Benedicto HECK DO                                        ~
DATE OF SERVICE: 11/18/2021
 
INPATIENT PSYCHIATRIC DISCHARGE SUMMARY
 
ATTENDING PSYCHIATRIST:  Benedicto Fitzgerald D.O.
 
MEDICAL CONSULTANT:  Lan Perales M.D.
 
DISCHARGE DIAGNOSES:  Major neurocognitive disorder without behavioral 
disturbance, major depressive disorder at least single episode, severe degree, 
with psychotic features, improved.
 
MEDICAL COMORBIDITIES:  Include morbid obesity, hypertension, hyperlipidemia, 
and hypothyroidism.  The patient is discharging to the [facility].  
The patient requires 24-hour assistance and supervision.
 
Psychiatric and medical care per receiving facility.  He also is being a client 
at case management at [facility].  His , 
David, was very involved in assisting with discharge planning.
 
No alcohol, no illicit drugs.  The patient is a nonsmoker.
 
DISCHARGE MEDICATIONS:  As follows.  Atorvastatin 40 mg oral daily for 
hyperlipidemia, metoprolol succinate 100 mg oral daily for hypertension, 
losartan 100 mg oral daily for hypertension, venlafaxine extended release 150 mg
oral daily, risperidone 3 mg oral twice daily for psychosis, senna docusate 2 
tabs oral twice daily for bowel motility, hold if diarrhea, levothyroxine 112 
mcg oral daily at 0700 hours for thyroid replacement.
 
The patient was a FULL CODE during this admission.
 
LABORATORY DATA:  Significant laboratories are as follows:  Hematology:  White 
count 7.4, H and H 13.6 and 41.1, platelet count 252.  Chemistry:  Sodium 135, 
potassium 3.8, chloride 102, bicarbonate 26, anion gap 7, BUN 19, creatinine 
1.1, estimated GFR 68, glucose 105.  A1c 6.0, calcium 9.0, total bilirubin 0.5, 
AST 29, ALT 69, alkaline phosphatase 131, total protein 6.7, albumin 3.0.  
Triglycerides 142, cholesterol 176, , HDL 46, B12 level 519, total 
vitamin D 17.8.  TSH 2.003.  Syphilis serology nonreactive.  SARS-CoV-2 PCR not 
detected on 11/03, 11/06, 11/08, and 11/15.  Head CT scan was done on 11/09 as a
part of dementia workup shows no evidence of acute intracranial hemorrhage or 
other acute intracranial abnormality.
 
 
 
[address]   [address]                     DISCHARGE SUMMARY             
_______________________________________________________________________________
 
Name:       VICKEY LOPEZ TIESHA                Room #:         520B-B      [facility] [place]  
M.R.#:      [id number]                       Account #:      [id number]  
Admission:  11/03/21    Attend Phys:    Benedicto Fitzgerald, 
Discharge:  11/18/21    Date of Birth:  [birth year]  
                                                          Report #: [id number]
                                                                    [id number] 
_______________________________________________________________________________
 
REASON FOR ADMISSION:  Back on 11/03, a 61-year-old single morbidly obese 
 male transferred from [facility] following an intentional 
overdose.  This was felt to be a suicide attempt.  His brother, Ted, was 
going to be moving back to [facility] from [place] shortly.  A day 
or so after admission, the patient admitted auditory command hallucinations to 
harm himself.
 
HOSPITAL COURSE:  The patient was admitted to Geriatric Psychiatry Unit.  The 
patient was switched from Celexa to venlafaxine due to efficacy concerns of him 
being at the maximum dose available for this medication.  We started him on 
Risperdal once he admitted to the auditory hallucinations.  This was titrated to
3 mg b.i.d.  The patient during the admission at times a bit medication-seeking.
 Benzodiazepine and Ativan was used intermittently.  Also, his initial cognitive
screening was in single digits on SLUMS.
We did the usual laboratories including
syphilis, B12 and a head CT.  They were generally unremarkable.  Later on in the
admission, the speech therapist did [place] [facility] Assessment, the patient 
had improved to 17/30.  We had several family meetings where the need for 
placement was discussed, difficulties involved in finding that and arranging 
care for the patient.  I thought until day of discharge, he was going to 
need to go to some crisis housing that
[facility] would provide, but the [facility] came through.  On the day of discharge, the patient was not 
suicidal, homicidal, and felt to be ready for step-down.
 
PHYSICAL EXAMINATION:
VITAL SIGNS:  Temperature 36.7, pulse 101, respirations 18, /64, and O2 
sat 94%.
MUSCULOSKELETAL:  Normal gait and station, suboptimal hygiene.
MENTAL STATUS EXAMINATION:  This is a well-developed, morbidly obese  
male, appearing older than stated age.  Attention limited.  Concentration 
limited.  Speech slowed.  Difficulty articulating at times.  Thought process:  
Linear, limited.  Thought content:  Relative poverty of thought.  Mood and 
affect, okay constricted, congruent.  Denied SI, HI.  Some helplessness, no 
hopelessness.  Memory not formally tested.  Insight and judgment were limited.  
Fund of knowledge below average.
 
PROGNOSIS:  For this patient is guarded as he appears to have an early 
neurodegenerative disease on top of his depression with psychotic features.  The
patient will require extensive support in his new living situation.  I think his
brother, Ted, is aware of this and also his brother, Paloma
 
 
 
[facility]
[address]
[place]   [address]                     DISCHARGE SUMMARY             
_______________________________________________________________________________
 
Name:       VICKEY LOPEZ                Room #:         520B-B      DIS IN  
M.R.#:      [id number]                       Account #:      [id number]  
Admission:  11/03/21    Attend Phys:    Benedicto Fitzgerald DO
Discharge:  11/18/21    Date of Birth:  [birth year]  
                                                          Report #: [id number]
                                                                    [id number] 
_______________________________________________________________________________
 
Also, the patient should not drive.
 
 
 
 
 
 
 
 
 
 
 
 
 
 
 
 
 
 
 
 
 
 
 
 
 
 
 
 
 
 
 
 
 
 
 
 
 
 
 
 
 
 
  <ELECTRONICALLY SIGNED>
   By: Benedicto Fitzgerald DO        
  11/21/21     1542
D: 11/18/21 2021                           _____________________________________
T: 11/18/21 2137                           Benedicto Fitzgerald DO          /nt